# Patient Record
Sex: MALE | Race: WHITE | Employment: FULL TIME | URBAN - METROPOLITAN AREA
[De-identification: names, ages, dates, MRNs, and addresses within clinical notes are randomized per-mention and may not be internally consistent; named-entity substitution may affect disease eponyms.]

---

## 2020-11-23 ENCOUNTER — HOSPITAL ENCOUNTER (OUTPATIENT)
Age: 30
Setting detail: SPECIMEN
Discharge: HOME | End: 2020-11-23
Payer: OTHER GOVERNMENT

## 2020-11-23 ENCOUNTER — OFFICE VISIT (OUTPATIENT)
Dept: FAMILY MEDICINE CLINIC | Age: 30
End: 2020-11-23
Payer: OTHER GOVERNMENT

## 2020-11-23 VITALS
HEIGHT: 74 IN | TEMPERATURE: 98.4 F | RESPIRATION RATE: 16 BRPM | BODY MASS INDEX: 31.83 KG/M2 | HEART RATE: 81 BPM | WEIGHT: 248 LBS | OXYGEN SATURATION: 98 %

## 2020-11-23 PROCEDURE — 99213 OFFICE O/P EST LOW 20 MIN: CPT | Performed by: NURSE PRACTITIONER

## 2020-11-23 RX ORDER — ZOLPIDEM TARTRATE 10 MG/1
TABLET ORAL
COMMUNITY
Start: 2020-10-23

## 2020-11-23 ASSESSMENT — ENCOUNTER SYMPTOMS
SINUS PRESSURE: 0
SHORTNESS OF BREATH: 0
SORE THROAT: 0
CHEST TIGHTNESS: 0
VOICE CHANGE: 0
WHEEZING: 0
EYE REDNESS: 0
EYE DISCHARGE: 0
COUGH: 0

## 2020-11-23 NOTE — PROGRESS NOTES
[address] [facility]  [address] [facility] [address] [facility] [address] [place] [address]  Dept: [phone]  Dept Fax: [phone]     Lazaro Torres is a 26 y.o. male who presents to the urgent care today for his medicalconditions/complaints as noted below. Nadine Bro is c/o of Congestion (pt is here for congestion, headache, runny nose, fever )    HPI:      Fever    This is a new problem. The current episode started in the past 7 days. The problem has been unchanged. The maximum temperature noted was 100 to 100.9 F. Associated symptoms include congestion and headaches. Pertinent negatives include no chest pain, coughing, ear pain, rash, sore throat or wheezing. He has tried acetaminophen for the symptoms. The treatment provided mild relief. Risk factors: sick contacts (covid exposure by neighbor)      History reviewed. No pertinent past medical history. Current Outpatient Medications   Medication Sig Dispense Refill    sertraline (ZOLOFT) 50 MG tablet TK 1 T PO QD FOR 1 WK THEN TK 2 TS PO D THEREAFTER FOR 30 DAYS      zolpidem (AMBIEN) 10 MG tablet TK 1 T PO HS PRF SLEEP       No current facility-administered medications for this visit. No Known Allergies    Reviewed PMH, SH, and FH with the patient and updated. Subjective:      Review of Systems   Constitutional: Positive for fever. Negative for chills and fatigue. HENT: Positive for congestion. Negative for ear discharge, ear pain, postnasal drip, sinus pressure, sneezing, sore throat and voice change. Eyes: Negative for discharge and redness. Respiratory: Negative for cough, chest tightness, shortness of breath and wheezing. Cardiovascular: Negative. Negative for chest pain. Musculoskeletal: Negative for myalgias. Skin: Negative for rash. Neurological: Positive for headaches. Negative for dizziness, weakness and light-headedness. Hematological: Negative for adenopathy.    All other systems reviewed and are negative. Objective:      Physical Exam  Vitals signs and nursing note reviewed. Constitutional:       General: He is not in acute distress. Appearance: Normal appearance. He is well-developed. He is not ill-appearing, toxic-appearing or diaphoretic. HENT:      Head: Normocephalic. Right Ear: Tympanic membrane and external ear normal.      Left Ear: Tympanic membrane and external ear normal.      Nose: Nose normal.      Right Sinus: No maxillary sinus tenderness or frontal sinus tenderness. Left Sinus: No maxillary sinus tenderness or frontal sinus tenderness. Mouth/Throat:      Pharynx: No oropharyngeal exudate or posterior oropharyngeal erythema. Eyes:      General:         Right eye: No discharge. Left eye: No discharge. Cardiovascular:      Rate and Rhythm: Normal rate and regular rhythm. Heart sounds: Normal heart sounds. No murmur. Pulmonary:      Effort: Pulmonary effort is normal. No respiratory distress. Breath sounds: Normal breath sounds. No wheezing or rales. Lymphadenopathy:      Cervical: No cervical adenopathy. Skin:     General: Skin is warm. Findings: No rash. Neurological:      Mental Status: He is alert. Pulse 81   Temp 98.4 °F (36.9 °C) (Temporal)   Resp 16   Ht 6' 2\" (1.88 m)   Wt 248 lb (112.5 kg)   SpO2 98%   BMI 31.84 kg/m²     Assessment:       Diagnosis Orders   1. Fever, unspecified fever cause  COVID-19 Ambulatory     Plan: Will send out COVID19 testing. Possible treatment alterations based on the results. Patient instructed to self-quarantine until testing results are back. Patient instructed not to return to work until results are back. Tylenol as needed for fever/pain. Encouraged adequate hydration and rest.  The patient indicates understanding of these issues and agrees with the plan. Educational materials provided on AVS.  Follow up if symptoms do not improve/worsen.  Discussed symptoms that will warrant urgent ED evaluation/treatment. Patient given educational materials - see patient instructions. Discussed use, benefit, and side effects of prescribed medications. All patientquestions answered. Pt voiced understanding.     Electronically signed by SALEEM Andrade CNP on 11/23/2020at 12:22 PM

## 2020-11-23 NOTE — LETTER
[address] [place] [address]  Phone: [phone]  Fax: [phone]    SALEEM Campoverde CNP        November 23, 2020     Patient: Justin Sanchez   YOB: 1990   Date of Visit: 11/23/2020       To Whom it May Concern:    Malvin Dao was seen in my clinic on 11/23/2020. Please excuse his absence, he is currently awaiting COVID-19 testing results. If you have any questions or concerns, please don't hesitate to call.     Sincerely,         SALEEM Campoverde CNP

## 2020-11-27 LAB — SARS-COV-2, NAA: NOT DETECTED

## 2021-01-13 ENCOUNTER — NURSE ONLY (OUTPATIENT)
Dept: PRIMARY CARE CLINIC | Age: 31
End: 2021-01-13

## 2021-01-13 DIAGNOSIS — Z02.83 ENCOUNTER FOR DRUG SCREENING: Primary | ICD-10-CM

## 2023-12-28 ENCOUNTER — HOSPITAL ENCOUNTER (EMERGENCY)
Age: 33
Discharge: HOME | End: 2023-12-28
Attending: EMERGENCY MEDICINE
Payer: COMMERCIAL

## 2023-12-28 VITALS
BODY MASS INDEX: 29.52 KG/M2 | TEMPERATURE: 97.8 F | DIASTOLIC BLOOD PRESSURE: 110 MMHG | WEIGHT: 230 LBS | HEART RATE: 79 BPM | SYSTOLIC BLOOD PRESSURE: 153 MMHG | HEIGHT: 74 IN | OXYGEN SATURATION: 97 % | RESPIRATION RATE: 16 BRPM

## 2023-12-28 DIAGNOSIS — S01.81XA FACIAL LACERATION, INITIAL ENCOUNTER: Primary | ICD-10-CM

## 2023-12-28 PROCEDURE — 99282 EMERGENCY DEPT VISIT SF MDM: CPT | Performed by: STUDENT IN AN ORGANIZED HEALTH CARE EDUCATION/TRAINING PROGRAM

## 2023-12-28 RX ORDER — ESCITALOPRAM OXALATE 20 MG/1
10 TABLET ORAL
COMMUNITY
Start: 2023-12-11

## 2023-12-28 ASSESSMENT — PAIN SCALES - GENERAL: PAINLEVEL_OUTOF10: 1

## 2023-12-28 ASSESSMENT — PAIN - FUNCTIONAL ASSESSMENT: PAIN_FUNCTIONAL_ASSESSMENT: 0-10

## 2023-12-28 NOTE — ED PROVIDER NOTES
[facility] ED  Emergency Department Encounter  Emergency Medicine Resident     Pt Name:Wilfred Bro  MRN: [id number]  Birthdate 1990  Date of evaluation: 12/28/23  PCP:  No primary care provider on file.  Note Started: 7:12 AM EST      CHIEF COMPLAINT       Chief Complaint   Patient presents with    Facial Injury       HISTORY OF PRESENT ILLNESS  (Location/Symptom, Timing/Onset, Context/Setting, Quality, Duration, Modifying Factors, Severity.)      Wilfred Bro is a 33 y.o. male who presents with facial injury.  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  He sustained a small cut to the lateral aspect of his nose on the right side.  He does not have any fevers chills.  No nausea or vomiting.  He did not lose consciousness.  Patient just got off of his shift at work.  He was advised to go to get checked out in the emergency department.  Patient is not complaining of any other symptoms today.  His tetanus shot was 2 years ago.    PAST MEDICAL / SURGICAL / SOCIAL / FAMILY HISTORY      has no past medical history on file.       has a past surgical history that includes Vasectomy (2015).      Social History     Socioeconomic History    Marital status:      Spouse name: Not on file    Number of children: Not on file    Years of education: Not on file    Highest education level: Not on file   Occupational History    Not on file   Tobacco Use    Smoking status: Never    Smokeless tobacco: Former   Substance and Sexual Activity    Alcohol use: No     Alcohol/week: 0.0 standard drinks of alcohol    Drug use: Not on file    Sexual activity: Not on file   Other Topics Concern    Not on file   Social History Narrative    Not on file     Social Determinants of Health     Financial Resource Strain: Not on file   Food Insecurity: Not on file   Transportation Needs: Not on file   Physical Activity: Not on file   Stress: Not on file   Social  extremities. DDX/DIAGNOSTIC RESULTS / EMERGENCY DEPARTMENT COURSE / MDM     Medical Decision Making  Small laceration to the right nasolabial fold. Glue applied. Worker's Comp. paperwork filled out. Tetanus shot is up-to-date, received shot approximately 2 years ago. No other concerns at this time. Patient is appropriate for discharge and outpatient follow-up            EMERGENCY DEPARTMENT COURSE:      ED Course as of 12/28/23 0736   Thu Dec 28, 2023   0732 Workers comp paperwork filled out. Glue applied to wound. Deandre Phoenix   [id number] Pt discharged to occupational health. [KK]      ED Course User Index  [KK] Memory DO Ryan       FINAL IMPRESSION      1.  Facial laceration, initial encounter          DISPOSITION / PLAN     DISPOSITION Decision To Discharge 12/28/2023 07:21:02 AM      PATIENT REFERRED TO:  [facility]  [address]  [phone]  Go to   As needed    [address] [facility]  [address]  [phone]  Schedule an appointment as soon as possible for a visit in 1 day        DISCHARGE MEDICATIONS:  Current Discharge Medication List          Adelaide Horn DO  Emergency Medicine Resident    (Please note that portions of this note were completed with a voice recognition program.  Efforts were made to edit the dictations but occasionally words are mis-transcribed.)

## 2023-12-28 NOTE — ED NOTES
Pt comes to ED with c/o facial injury. Pt states being at work, hit by metal on face, no LOC, no blood thinners, some dizziness, no falls, last tetanus shot recently. Pt a/o x4, resting on stretcher, RR even and non labored, call light within reach.

## 2023-12-28 NOTE — DISCHARGE INSTRUCTIONS
You have been seen in the ER today for facial injury. If you begin to experience any symptoms such as chest pain shortness of breath nausea vomiting dizziness drowsiness abdominal pain loss of consciousness or any other symptoms you find concerning please return to the ED for follow-up evaluation. If you have been given pain medication please take them only as prescribed. Do not take more medication than prescribed at any given time. Please follow-up with your primary care provider within 3-5 days for continued care, sooner if you have concerns.